# Patient Record
Sex: MALE | Race: BLACK OR AFRICAN AMERICAN | Employment: UNEMPLOYED | URBAN - METROPOLITAN AREA
[De-identification: names, ages, dates, MRNs, and addresses within clinical notes are randomized per-mention and may not be internally consistent; named-entity substitution may affect disease eponyms.]

---

## 2023-12-08 ENCOUNTER — HOSPITAL ENCOUNTER (EMERGENCY)
Age: 38
Discharge: HOME | End: 2023-12-08

## 2023-12-08 VITALS
DIASTOLIC BLOOD PRESSURE: 97 MMHG | HEIGHT: 73 IN | OXYGEN SATURATION: 100 % | SYSTOLIC BLOOD PRESSURE: 128 MMHG | WEIGHT: 162.92 LBS | TEMPERATURE: 98.3 F | BODY MASS INDEX: 21.59 KG/M2 | RESPIRATION RATE: 19 BRPM | HEART RATE: 81 BPM

## 2023-12-08 DIAGNOSIS — R42 DIZZINESS: Primary | ICD-10-CM

## 2023-12-08 LAB
GLUCOSE BLD-MCNC: 87 MG/DL
GLUCOSE BLD-MCNC: 87 MG/DL (ref 70–99)
PERFORMED ON: NORMAL

## 2023-12-08 PROCEDURE — 99283 EMERGENCY DEPT VISIT LOW MDM: CPT

## 2023-12-08 PROCEDURE — 6370000000 HC RX 637 (ALT 250 FOR IP): Performed by: NURSE PRACTITIONER

## 2023-12-08 RX ORDER — MECLIZINE HCL 12.5 MG/1
25 TABLET ORAL ONCE
Status: COMPLETED | OUTPATIENT
Start: 2023-12-08 | End: 2023-12-08

## 2023-12-08 RX ORDER — PROMETHAZINE HYDROCHLORIDE 25 MG/1
25 TABLET ORAL ONCE
Status: COMPLETED | OUTPATIENT
Start: 2023-12-08 | End: 2023-12-08

## 2023-12-08 RX ADMIN — MECLIZINE 25 MG: 12.5 TABLET ORAL at 19:20

## 2023-12-08 RX ADMIN — PROMETHAZINE HYDROCHLORIDE 25 MG: 25 TABLET ORAL at 19:19

## 2023-12-08 ASSESSMENT — PAIN - FUNCTIONAL ASSESSMENT: PAIN_FUNCTIONAL_ASSESSMENT: NONE - DENIES PAIN

## 2023-12-10 ENCOUNTER — HOSPITAL ENCOUNTER (EMERGENCY)
Age: 38
Discharge: HOME | End: 2023-12-10
Payer: OTHER MISCELLANEOUS

## 2023-12-10 ENCOUNTER — APPOINTMENT (OUTPATIENT)
Dept: GENERAL RADIOLOGY | Age: 38
End: 2023-12-10
Payer: OTHER MISCELLANEOUS

## 2023-12-10 VITALS
HEART RATE: 74 BPM | DIASTOLIC BLOOD PRESSURE: 89 MMHG | RESPIRATION RATE: 18 BRPM | HEIGHT: 73 IN | WEIGHT: 159.39 LBS | TEMPERATURE: 98.7 F | SYSTOLIC BLOOD PRESSURE: 116 MMHG | OXYGEN SATURATION: 100 % | BODY MASS INDEX: 21.12 KG/M2

## 2023-12-10 DIAGNOSIS — M79.18 MUSCULOSKELETAL PAIN: ICD-10-CM

## 2023-12-10 DIAGNOSIS — M25.562 ACUTE PAIN OF BOTH KNEES: Primary | ICD-10-CM

## 2023-12-10 DIAGNOSIS — M79.672 ACUTE FOOT PAIN, LEFT: ICD-10-CM

## 2023-12-10 DIAGNOSIS — M25.561 ACUTE PAIN OF BOTH KNEES: Primary | ICD-10-CM
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
## 2023-12-10 PROCEDURE — 73562 X-RAY EXAM OF KNEE 3: CPT

## 2023-12-10 PROCEDURE — 6370000000 HC RX 637 (ALT 250 FOR IP): Performed by: NURSE PRACTITIONER

## 2023-12-10 PROCEDURE — 99283 EMERGENCY DEPT VISIT LOW MDM: CPT

## 2023-12-10 PROCEDURE — 73630 X-RAY EXAM OF FOOT: CPT

## 2023-12-10 RX ORDER — ACETAMINOPHEN 500 MG
500 TABLET ORAL 4 TIMES DAILY PRN
Qty: 28 TABLET | Refills: 0 | Status: SHIPPED | OUTPATIENT
Start: 2023-12-10 | End: 2023-12-17

## 2023-12-10 RX ORDER — ACETAMINOPHEN 500 MG
1000 TABLET ORAL ONCE
Status: COMPLETED | OUTPATIENT
Start: 2023-12-10 | End: 2023-12-10

## 2023-12-10 RX ORDER — IBUPROFEN 600 MG/1
600 TABLET ORAL 3 TIMES DAILY PRN
Qty: 15 TABLET | Refills: 0 | Status: SHIPPED | OUTPATIENT
Start: 2023-12-10 | End: 2023-12-15

## 2023-12-10 RX ORDER — IBUPROFEN 600 MG/1
600 TABLET ORAL ONCE
Status: COMPLETED | OUTPATIENT
Start: 2023-12-10 | End: 2023-12-10

## 2023-12-10 RX ADMIN — ACETAMINOPHEN 1000 MG: 500 TABLET ORAL at 14:50

## 2023-12-10 RX ADMIN — IBUPROFEN 600 MG: 600 TABLET, FILM COATED ORAL at 14:49

## 2023-12-10 ASSESSMENT — PAIN DESCRIPTION - DESCRIPTORS: DESCRIPTORS: ACHING;SORE

## 2023-12-10 ASSESSMENT — PAIN SCALES - GENERAL
PAINLEVEL_OUTOF10: 8
PAINLEVEL_OUTOF10: 8

## 2023-12-10 ASSESSMENT — PAIN DESCRIPTION - PAIN TYPE: TYPE: ACUTE PAIN
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
## 2023-12-10 NOTE — DISCHARGE INSTRUCTIONS
Return to the emergency department for new or worsening symptoms including, not limited to, developing worsening pain not relieved by medications, inability to walk, numbness tingling between your legs or any backside, loss of bowel or bladder control, weakness causing you to be unable to walk, or other symptoms/concerns. Medications as prescribed ensuring that you eat prior to taking ibuprofen as this is an NSAID medication that can otherwise cause gastrointestinal bleeding/stomach ulcers if taken on empty stomach.     Follow-up with the physician referral service line or to establish a PCP for your future nonemergent medical needs

## 2023-12-11 NOTE — ED NOTES
[id number]   pain is the same per pt (right wrist at 4, right abd/side area at 8, bilat knees at 8, left foot at 4, and HA at 6). discharge instructions with pt. Explained rx's. Pt asking for a muscle relaxer. NP updated and to bedside to talk to pt. NP explained why he wasn't ordering a muscle relaxer. Questions answered and then pt discharged to home. Gait steady. Not limping.      Stefany Stevens RN  12/11/23 [id number]

## 2023-12-11 NOTE — ED NOTES
Already examined by NP.  explained new orders. Meds being given. Pain is the same.        Reynold Macias, RN  12/11/23 [id number]

## 2023-12-11 NOTE — ED NOTES
Pain is the same. Waiting for xray results . Family at bedside.      Nurys Del Valle RN  12/11/23 5396

## 2023-12-12 ASSESSMENT — ENCOUNTER SYMPTOMS
ANAL BLEEDING: 0
BACK PAIN: 0
NAUSEA: 0
ABDOMINAL PAIN: 0
SHORTNESS OF BREATH: 0
SORE THROAT: 0
VOMITING: 0
EYE PAIN: 0
COUGH: 0